# Patient Record
Sex: MALE | Race: BLACK OR AFRICAN AMERICAN | Employment: UNEMPLOYED | URBAN - METROPOLITAN AREA
[De-identification: names, ages, dates, MRNs, and addresses within clinical notes are randomized per-mention and may not be internally consistent; named-entity substitution may affect disease eponyms.]

---

## 2017-05-16 ENCOUNTER — APPOINTMENT (OUTPATIENT)
Dept: GENERAL RADIOLOGY | Age: 32
End: 2017-05-16
Payer: MEDICAID

## 2017-05-16 ENCOUNTER — HOSPITAL ENCOUNTER (EMERGENCY)
Age: 32
Discharge: HOME | End: 2017-05-16
Attending: EMERGENCY MEDICINE
Payer: MEDICAID

## 2017-05-16 VITALS
DIASTOLIC BLOOD PRESSURE: 86 MMHG | SYSTOLIC BLOOD PRESSURE: 143 MMHG | OXYGEN SATURATION: 98 % | RESPIRATION RATE: 16 BRPM | HEART RATE: 94 BPM | WEIGHT: 191 LBS | TEMPERATURE: 99 F

## 2017-05-16 DIAGNOSIS — R07.9 CHEST PAIN, UNSPECIFIED TYPE: Primary | ICD-10-CM

## 2017-05-16 DIAGNOSIS — R74.8 ELEVATED CK: ICD-10-CM

## 2017-05-16 LAB
ANION GAP SERPL CALCULATED.3IONS-SCNC: 16 MMOL/L (ref 9–17)
BILIRUBIN URINE: NEGATIVE
BUN BLDV-MCNC: 19 MG/DL (ref 6–20)
BUN/CREAT BLD: ABNORMAL (ref 9–20)
CALCIUM SERPL-MCNC: 9.3 MG/DL (ref 8.6–10.4)
CHLORIDE BLD-SCNC: 103 MMOL/L (ref 98–107)
CO2: 20 MMOL/L (ref 20–31)
COLOR: YELLOW
COMMENT UA: NORMAL
CREAT SERPL-MCNC: 0.85 MG/DL (ref 0.7–1.2)
GFR AFRICAN AMERICAN: >60 ML/MIN
GFR NON-AFRICAN AMERICAN: >60 ML/MIN
GFR SERPL CREATININE-BSD FRML MDRD: ABNORMAL ML/MIN/{1.73_M2}
GFR SERPL CREATININE-BSD FRML MDRD: ABNORMAL ML/MIN/{1.73_M2}
GLUCOSE BLD-MCNC: 120 MG/DL (ref 70–99)
GLUCOSE URINE: NEGATIVE
HCT VFR BLD CALC: 37.3 % (ref 41–53)
HEMOGLOBIN: 12.4 G/DL (ref 13.5–17.5)
KETONES, URINE: NEGATIVE
LEUKOCYTE ESTERASE, URINE: NEGATIVE
MCH RBC QN AUTO: 26.3 PG (ref 26–34)
MCHC RBC AUTO-ENTMCNC: 33.3 G/DL (ref 31–37)
MCV RBC AUTO: 78.9 FL (ref 80–100)
MYOGLOBIN: 59 NG/ML (ref 28–72)
NITRITE, URINE: NEGATIVE
PDW BLD-RTO: 15.8 % (ref 12.5–15.4)
PH UA: 5 (ref 5–8)
PLATELET # BLD: 182 K/UL (ref 140–450)
PMV BLD AUTO: 9.8 FL (ref 6–12)
POC TROPONIN I: 0 NG/ML (ref 0–0.1)
POC TROPONIN I: 0 NG/ML (ref 0–0.1)
POC TROPONIN INTERP: NORMAL
POC TROPONIN INTERP: NORMAL
POTASSIUM SERPL-SCNC: 4.2 MMOL/L (ref 3.7–5.3)
PROTEIN UA: NEGATIVE
RBC # BLD: 4.73 M/UL (ref 4.5–5.9)
SODIUM BLD-SCNC: 139 MMOL/L (ref 135–144)
SPECIFIC GRAVITY UA: 1.02 (ref 1–1.03)
TOTAL CK: 407 U/L (ref 39–308)
TURBIDITY: CLEAR
URINE HGB: NEGATIVE
UROBILINOGEN, URINE: NORMAL
WBC # BLD: 8 K/UL (ref 3.5–11)

## 2017-05-16 PROCEDURE — 83874 ASSAY OF MYOGLOBIN: CPT

## 2017-05-16 PROCEDURE — 82550 ASSAY OF CK (CPK): CPT

## 2017-05-16 PROCEDURE — G0384 LEV 5 HOSP TYPE B ED VISIT: HCPCS

## 2017-05-16 PROCEDURE — 81003 URINALYSIS AUTO W/O SCOPE: CPT

## 2017-05-16 PROCEDURE — 2580000003 HC RX 258: Performed by: EMERGENCY MEDICINE

## 2017-05-16 PROCEDURE — 71020 XR CHEST STANDARD TWO VW: CPT

## 2017-05-16 PROCEDURE — 84484 ASSAY OF TROPONIN QUANT: CPT

## 2017-05-16 PROCEDURE — 80048 BASIC METABOLIC PNL TOTAL CA: CPT

## 2017-05-16 PROCEDURE — 85027 COMPLETE CBC AUTOMATED: CPT

## 2017-05-16 PROCEDURE — 93005 ELECTROCARDIOGRAM TRACING: CPT

## 2017-05-16 RX ORDER — 0.9 % SODIUM CHLORIDE 0.9 %
1000 INTRAVENOUS SOLUTION INTRAVENOUS ONCE
Status: COMPLETED | OUTPATIENT
Start: 2017-05-16 | End: 2017-05-16

## 2017-05-16 RX ORDER — DIVALPROEX SODIUM 250 MG/1
250 TABLET, DELAYED RELEASE ORAL 3 TIMES DAILY
COMMUNITY

## 2017-05-16 RX ORDER — RISPERIDONE 0.5 MG/1
0.5 TABLET, FILM COATED ORAL 2 TIMES DAILY
COMMUNITY

## 2017-05-16 RX ADMIN — SODIUM CHLORIDE 1000 ML: 9 INJECTION, SOLUTION INTRAVENOUS at 15:12

## 2017-05-16 ASSESSMENT — PAIN SCALES - GENERAL: PAINLEVEL_OUTOF10: 10

## 2017-05-16 ASSESSMENT — PAIN DESCRIPTION - PAIN TYPE: TYPE: ACUTE PAIN

## 2017-05-16 ASSESSMENT — ENCOUNTER SYMPTOMS
VOMITING: 0
SORE THROAT: 0
ABDOMINAL PAIN: 0
RHINORRHEA: 0
NAUSEA: 0
COLOR CHANGE: 0
DIARRHEA: 1

## 2017-05-16 ASSESSMENT — PAIN DESCRIPTION - DESCRIPTORS: DESCRIPTORS: ACHING

## 2017-05-19 LAB
EKG ATRIAL RATE: 98 BPM
EKG P AXIS: 69 DEGREES
EKG P-R INTERVAL: 136 MS
EKG Q-T INTERVAL: 336 MS
EKG QRS DURATION: 74 MS
EKG QTC CALCULATION (BAZETT): 428 MS
EKG R AXIS: 76 DEGREES
EKG T AXIS: 40 DEGREES
EKG VENTRICULAR RATE: 98 BPM

## 2017-12-04 ENCOUNTER — HOSPITAL ENCOUNTER (EMERGENCY)
Age: 32
Discharge: HOME | End: 2017-12-04
Attending: EMERGENCY MEDICINE
Payer: MEDICAID

## 2017-12-04 ENCOUNTER — APPOINTMENT (OUTPATIENT)
Dept: GENERAL RADIOLOGY | Age: 32
End: 2017-12-04
Payer: MEDICAID

## 2017-12-04 VITALS
BODY MASS INDEX: 26.79 KG/M2 | RESPIRATION RATE: 16 BRPM | HEART RATE: 66 BPM | DIASTOLIC BLOOD PRESSURE: 81 MMHG | SYSTOLIC BLOOD PRESSURE: 126 MMHG | HEIGHT: 76 IN | OXYGEN SATURATION: 94 % | WEIGHT: 220 LBS | TEMPERATURE: 97.5 F

## 2017-12-04 DIAGNOSIS — R09.81 NASAL SINUS CONGESTION: ICD-10-CM

## 2017-12-04 DIAGNOSIS — R05.9 COUGH: Primary | ICD-10-CM

## 2017-12-04 LAB
EKG ATRIAL RATE: 68 BPM
EKG P AXIS: 59 DEGREES
EKG P-R INTERVAL: 152 MS
EKG Q-T INTERVAL: 376 MS
EKG QRS DURATION: 82 MS
EKG QTC CALCULATION (BAZETT): 399 MS
EKG R AXIS: 75 DEGREES
EKG T AXIS: 36 DEGREES
EKG VENTRICULAR RATE: 68 BPM

## 2017-12-04 PROCEDURE — 71020 XR CHEST STANDARD TWO VW: CPT

## 2017-12-04 PROCEDURE — 6370000000 HC RX 637 (ALT 250 FOR IP): Performed by: STUDENT IN AN ORGANIZED HEALTH CARE EDUCATION/TRAINING PROGRAM

## 2017-12-04 PROCEDURE — 93005 ELECTROCARDIOGRAM TRACING: CPT

## 2017-12-04 PROCEDURE — 99284 EMERGENCY DEPT VISIT MOD MDM: CPT

## 2017-12-04 RX ORDER — BENZONATATE 100 MG/1
100 CAPSULE ORAL ONCE
Status: COMPLETED | OUTPATIENT
Start: 2017-12-04 | End: 2017-12-04

## 2017-12-04 RX ORDER — BENZONATATE 100 MG/1
100 CAPSULE ORAL 3 TIMES DAILY PRN
Qty: 50 CAPSULE | Refills: 0 | Status: SHIPPED | OUTPATIENT
Start: 2017-12-04 | End: 2017-12-11

## 2017-12-04 RX ADMIN — BENZONATATE 100 MG: 100 CAPSULE ORAL at 11:09

## 2017-12-04 ASSESSMENT — ENCOUNTER SYMPTOMS
STRIDOR: 0
EYE PAIN: 0
CHOKING: 0
CHEST TIGHTNESS: 1
PHOTOPHOBIA: 0
EYE REDNESS: 1
RHINORRHEA: 0
SINUS PRESSURE: 1
ABDOMINAL DISTENTION: 0
APNEA: 0
NAUSEA: 0
EYE DISCHARGE: 0
CONSTIPATION: 0
TROUBLE SWALLOWING: 0
SINUS PAIN: 1
VOMITING: 0
SORE THROAT: 0
ABDOMINAL PAIN: 1
WHEEZING: 0
SHORTNESS OF BREATH: 0
EYE ITCHING: 1
VOICE CHANGE: 0
DIARRHEA: 0
COUGH: 1
ALLERGIC/IMMUNOLOGIC NEGATIVE: 1

## 2017-12-04 ASSESSMENT — PAIN SCALES - GENERAL: PAINLEVEL_OUTOF10: 8

## 2017-12-04 ASSESSMENT — PAIN DESCRIPTION - DESCRIPTORS: DESCRIPTORS: ACHING

## 2017-12-04 ASSESSMENT — PAIN DESCRIPTION - PAIN TYPE: TYPE: CHRONIC PAIN;ACUTE PAIN

## 2017-12-04 ASSESSMENT — PAIN DESCRIPTION - LOCATION: LOCATION: CHEST

## 2017-12-04 NOTE — ED PROVIDER NOTES
[facility] ED  eMERGENCY dEPARTMENT eNCOUnter   Attending Attestation     Pt Name: Sylvester Wall  MRN: [id number]  [place] 1985  Date of evaluation: 12/4/17       Sylvester Wall is a 34 y.o. male who presents with URI (pt c/o cough, sinus pressure and pain, congestion and green nasal drainage.) and Chest Pain (pt c/o chest pain x years)      History: Patient presents with chronic greater than 6 months history of sinus pressure, congestion, nasal drainage, right sided chest pain which she's had for years. Patient has had a cardiac workup for this in the past.  Patient denies any new symptoms. Patient says the pain is intermittent. Patient said it is sometimes reproducible with tapping on the right-sided chest.  Patient has no shortness of breath. No fever, chills, nausea, vomiting, diarrhea. Patient been treated for allergies by his primary care physician and he said this is not helping. Exam:   Physical Exam   Constitutional: He is well-developed, well-nourished, and in no distress. No distress. HENT:   Head: Normocephalic and atraumatic. Eyes: EOM are normal. Pupils are equal, round, and reactive to light. Right eye exhibits no discharge. Left eye exhibits no discharge. Neck: Normal range of motion. Neck supple. No JVD present. No tracheal deviation present. Cardiovascular: Normal rate, regular rhythm, normal heart sounds and intact distal pulses. Exam reveals no gallop and no friction rub. No murmur heard. Pulmonary/Chest: Effort normal and breath sounds normal. No stridor. No respiratory distress. He has no wheezes. He has no rales. Abdominal: Soft. Bowel sounds are normal. He exhibits no distension and no mass. There is no tenderness. There is no rebound and no guarding. Musculoskeletal: Normal range of motion. He exhibits no edema or tenderness. Neurological: He is alert. No cranial nerve deficit. Coordination normal. GCS score is 15.    Skin: Skin is warm and dry. No rash noted. No erythema. Psychiatric: Mood and affect normal.     EKG Interpretation    Interpreted by emergency department physician    Rhythm: sinus with sinus arrhythmia  Rate: normal  Axis: normal  Ectopy: none  Conduction: normal  ST Segments: no acute change  T Waves: no acute change  Q Waves: none    Clinical Impression: non-specific EKG, sinus rhythm with sinus arrhythmia. Coral El M.D. Will get chest xray, will treat cough, plan for discharge. Pt to follow up with pcp. I performed a history and physical examination of the patient and discussed management with the resident. I reviewed the residents note and agree with the documented findings and plan of care. Any areas of disagreement are noted on the chart. I was personally present for the key portions of any procedures. I have documented in the chart those procedures where I was not present during the key portions. I have personally reviewed all images and agree with the resident's interpretation. I have reviewed the emergency nurses triage note. I agree with the chief complaint, past medical history, past surgical history, allergies, medications, social and family history as documented unless otherwise noted below. Documentation of the HPI, Physical Exam and Medical Decision Making performed by medical students or scribes is based on my personal performance of the HPI, PE and MDM. For Phys Assistant/ Nurse Practitioner cases/documentation I have had a face to face evaluation of this patient and have completed at least one if not all key elements of the E/M (history, physical exam, and MDM). Additional findings are as noted. For APC cases I have personally evaluated and examined the patient in conjunction with the APC and agree with the treatment plan and disposition of the patient as recorded by the APC.     Coral El MD  Attending Emergency  Physician        Brendan Ignacio MD  12/04/17 [id number]

## 2017-12-04 NOTE — ED PROVIDER NOTES
[address]  ED  eMERGENCY dEPARTMENT eNCOUnter  Resident    Pt Name: Megan Kenney  MRN: [id number]  [place] 1985  Date of evaluation: 12/4/2017  PCP:  No primary care provider on file. CHIEF COMPLAINT       Chief Complaint   Patient presents with    URI     pt c/o cough, sinus pressure and pain, congestion and green nasal drainage.  Chest Pain     pt c/o chest pain x years       HISTORY OF PRESENT ILLNESS    Megan Kenney is a 34 y.o. male who presents with multiple complaints: sinus congestion with headache, cough, and central/right-sided chest pain. Patient states his condition has been chronic over years but worsening within the past few days. He denies any known allergies. He smokes about 1 pack per 3 days. He states zyrtec alleviates some of his upper respiratory symptoms but the nasal spray does not. He believes the headache is relates to his sinus congestion. He states the chest pain is worse with coughing, and he denies left-sided chest pain, palpitations or swelling in the legs. He admits his upper respiratory symptoms improved when he took a family member's mucinex. He also has a positive pscyh history and states he takes trazodone and Desyrel as prescribed. REVIEW OF SYSTEMS       Review of Systems   Constitutional: Negative for activity change, chills, diaphoresis, fatigue and fever. HENT: Positive for congestion, sinus pain and sinus pressure. Negative for ear discharge, ear pain, postnasal drip, rhinorrhea, sneezing, sore throat, tinnitus, trouble swallowing and voice change. Eyes: Positive for redness and itching. Negative for photophobia, pain, discharge and visual disturbance. Respiratory: Positive for cough and chest tightness. Negative for apnea, choking, shortness of breath, wheezing and stridor. Cardiovascular: Positive for chest pain. Negative for palpitations and leg swelling.         Left sided and central chest pain   Gastrointestinal: Positive for abdominal pain. Negative for abdominal distention, constipation, diarrhea, nausea and vomiting. Vague abdominal pain   Endocrine: Negative. Genitourinary: Negative. Musculoskeletal: Positive for myalgias. Negative for arthralgias. Chest pain, abdominal pain, non-specific   Skin: Negative for rash. Allergic/Immunologic: Negative. Neurological: Positive for headaches. Negative for syncope, speech difficulty, weakness and numbness. Hematological: Negative. Psychiatric/Behavioral: Negative for agitation and confusion. Diagnosis of schizophrenia and OCD       PAST MEDICAL HISTORY    has a past medical history of OCD (obsessive compulsive disorder) and Schizophrenia, borderline. SURGICAL HISTORY      has no past surgical history on file. CURRENT MEDICATIONS       Previous Medications    CETIRIZINE (ZYRTEC) 1 MG/ML SYRUP    Take 10 mg by mouth daily    DIVALPROEX (DEPAKOTE) 250 MG DR TABLET    Take 250 mg by mouth 3 times daily    OXYMETAZOLINE HCL (NASAL SPRAY NA)    by Nasal route    RISPERIDONE (RISPERDAL) 0.5 MG TABLET    Take 0.5 mg by mouth 2 times daily       ALLERGIES     has No Known Allergies. FAMILY HISTORY     has no family status information on file. family history is not on file. SOCIAL HISTORY      reports that he has been smoking. He does not have any smokeless tobacco history on file. He reports that he does not drink alcohol or use drugs. PHYSICAL EXAM     INITIAL VITALS:  height is 6' 4\" (1.93 m) and weight is 220 lb (99.8 kg). His oral temperature is 97.5 °F (36.4 °C). His blood pressure is 126/81 and his pulse is 66. His respiration is 16 and oxygen saturation is 94%. Physical Exam   Constitutional: He is oriented to person, place, and time. He appears well-developed and well-nourished. No distress. HENT:   Head: Normocephalic and atraumatic.    Right Ear: External ear normal.   Left Ear: External ear normal.   Nose: Nose normal.   Mouth/Throat: Oropharynx is clear and moist.   Eyes: Conjunctivae and EOM are normal. Pupils are equal, round, and reactive to light. Right eye exhibits no discharge. Left eye exhibits no discharge. Neck: Normal range of motion. Neck supple. Cardiovascular: Normal rate, regular rhythm and normal heart sounds. Pulmonary/Chest: Effort normal and breath sounds normal. No respiratory distress. He has no wheezes. He has no rales. He exhibits tenderness. Central and right-sided tenderness of chest   Abdominal: Soft. Bowel sounds are normal. He exhibits no distension. There is tenderness. Musculoskeletal: Normal range of motion. Neurological: He is alert and oriented to person, place, and time. Skin: Skin is warm and dry. No rash noted. He is not diaphoretic. No erythema. No pallor. Psychiatric: He has a normal mood and affect. His behavior is normal.       DIFFERENTIAL DIAGNOSIS/MDM:   URI vs Bronchitis vs Pneumonia     DIAGNOSTIC RESULTS     EKG: All EKG's are interpreted by the Emergency Department Physician who either signs or Co-signs this chart in the absence of a cardiologist.    Sinus arrhythmia     RADIOLOGY:   I directly visualized the following  images and reviewed the radiologist interpretations:  XR CHEST STANDARD (2 VW)   Final Result   Unremarkable chest.             ED BEDSIDE ULTRASOUND:   n/a    LABS:  Labs Reviewed - No data to display    n/a      EMERGENCY DEPARTMENT COURSE:   Vitals:    Vitals:    12/04/17 1020   BP: 126/81   Pulse: 66   Resp: 16   Temp: 97.5 °F (36.4 °C)   TempSrc: Oral   SpO2: 94%   Weight: 220 lb (99.8 kg)   Height: 6' 4\" (1.93 m)       CRITICAL CARE:  n/a    CONSULTS:  None      PROCEDURES:  Procedures - n/a      FINAL IMPRESSION      1. Cough    2. Nasal sinus congestion        Patient presents with symptoms consistent of chronic sinus congestions and cough with associated chest pain; he is afebrile on presentation.  EKG ruled out cardiac origin of

## 2020-07-24 ENCOUNTER — HOSPITAL ENCOUNTER (EMERGENCY)
Age: 35
Discharge: HOME | End: 2020-07-24
Attending: EMERGENCY MEDICINE
Payer: MEDICAID

## 2020-07-24 VITALS
TEMPERATURE: 98.1 F | SYSTOLIC BLOOD PRESSURE: 125 MMHG | DIASTOLIC BLOOD PRESSURE: 80 MMHG | HEART RATE: 72 BPM | BODY MASS INDEX: 24.36 KG/M2 | WEIGHT: 200 LBS | OXYGEN SATURATION: 97 % | HEIGHT: 76 IN | RESPIRATION RATE: 18 BRPM

## 2020-07-24 PROCEDURE — 99282 EMERGENCY DEPT VISIT SF MDM: CPT

## 2020-07-24 RX ORDER — NEOMYCIN SULFATE, POLYMYXIN B SULFATE, HYDROCORTISONE 3.5; 10000; 1 MG/ML; [USP'U]/ML; MG/ML
1 SOLUTION/ DROPS AURICULAR (OTIC) EVERY 8 HOURS SCHEDULED
Qty: 1 BOTTLE | Refills: 0 | Status: SHIPPED | OUTPATIENT
Start: 2020-07-24 | End: 2020-08-03

## 2020-07-24 ASSESSMENT — ENCOUNTER SYMPTOMS
VOMITING: 0
NAUSEA: 0

## 2020-07-24 NOTE — ED PROVIDER NOTES
[address] [facility]     Emergency Department     Faculty Attestation    I performed a history and physical examination of the patient and discussed management with the resident. I reviewed the residents note and agree with the documented findings and plan of care. Any areas of disagreement are noted on the chart. I was personally present for the key portions of any procedures. I have documented in the chart those procedures where I was not present during the key portions. I have reviewed the emergency nurses triage note. I agree with the chief complaint, past medical history, past surgical history, allergies, medications, social and family history as documented unless otherwise noted below. For Physician Assistant/ Nurse Practitioner cases/documentation I have personally evaluated this patient and have completed at least one if not all key elements of the E/M (history, physical exam, and MDM). Additional findings are as noted. I have personally seen and evaluated the patient. I find the patient's history and physical exam are consistent with the NP/PA documentation. I agree with the care provided, treatment rendered, disposition and follow-up plan. fb r ear canal no bleeding       Critical Care     Derek Gonzales M.D.   Attending Emergency  Physician              Celso Mercedes MD  07/24/20 [id number]

## 2020-07-24 NOTE — ED PROVIDER NOTES
[facility] ED  Emergency Department Encounter  EmergencyMedicine Resident     Pt Name:Avinash Yi  MRN: [id number]  [place] 1985  Date of evaluation: 7/24/20  PCP:  No primary care provider on file. CHIEF COMPLAINT       Chief Complaint   Patient presents with    Foreign Body in Ear     Pt c/o something in rt ear       HISTORY OF PRESENT ILLNESS  (Location/Symptom, Timing/Onset, Context/Setting, Quality, Duration, Modifying Factors, Severity.)      Qasim Jackson is a 38 y.o. male who presents with foreign object in right ear. Patient placed a \"seed\" in his ear earlier today. He was unable to get it out. Patient tried removing it with a Q-tip but was unsuccessful. Patient states his ear is uncomfortable but not in acute pain. PAST MEDICAL / SURGICAL / SOCIAL / FAMILY HISTORY      has a past medical history of OCD (obsessive compulsive disorder) and Schizophrenia, borderline ([facility] [address] has no past surgical history on file.     Social History     Socioeconomic History    Marital status: Single     Spouse name: Not on file    Number of children: Not on file    Years of education: Not on file    Highest education level: Not on file   Occupational History    Not on file   Social Needs    Financial resource strain: Not on file    Food insecurity     Worry: Not on file     Inability: Not on file    Transportation needs     Medical: Not on file     Non-medical: Not on file   Tobacco Use    Smoking status: Current Every Day Smoker   Substance and Sexual Activity    Alcohol use: No    Drug use: No    Sexual activity: Not on file   Lifestyle    Physical activity     Days per week: Not on file     Minutes per session: Not on file    Stress: Not on file   Relationships    Social connections     Talks on phone: Not on file     Gets together: Not on file     Attends Mosque service: Not on file     Active member of club or organization: Not on file     Attends meetings of clubs or organizations: Not on file     Relationship status: Not on file    Intimate partner violence     Fear of current or ex partner: Not on file     Emotionally abused: Not on file     Physically abused: Not on file     Forced sexual activity: Not on file   Other Topics Concern    Not on file   Social History Narrative    Not on file       History reviewed. No pertinent family history. Allergies:  Patient has no known allergies. Home Medications:  Prior to Admission medications    Medication Sig Start Date End Date Taking? Authorizing Provider   neomycin-polymyxin-hydrocortisone 1 % SOLN otic solution Place 1 drop into both ears every 8 hours for 10 days 7/24/20 8/3/20 Yes Lamont Coughlin,    cetirizine (ZYRTEC) 1 MG/ML syrup Take 10 mg by mouth daily    Historical Provider, MD   Oxymetazoline HCl (NASAL SPRAY NA) by Nasal route    Historical Provider, MD   divalproex (DEPAKOTE) 250 MG DR tablet Take 250 mg by mouth 3 times daily    Historical Provider, MD   risperiDONE (RISPERDAL) 0.5 MG tablet Take 0.5 mg by mouth 2 times daily    Historical Provider, MD       REVIEW OF SYSTEMS    (2-9 systems for level 4, 10 or more for level 5)      Review of Systems   HENT: Positive for ear pain. Foreign object in ear   Gastrointestinal: Negative for nausea and vomiting. Skin: Negative for wound. Neurological: Negative for headaches. Psychiatric/Behavioral: Negative for self-injury and suicidal ideas. PHYSICAL EXAM   (up to 7 for level 4, 8 or more for level 5)      INITIAL VITALS:   /80   Pulse 72   Temp 98.1 °F (36.7 °C) (Oral)   Resp 18   Ht 6' 4\" (1.93 m)   Wt 200 lb (90.7 kg)   SpO2 97%   BMI 24.34 kg/m²     Physical Exam  Vitals signs reviewed. Constitutional:       General: He is not in acute distress. Appearance: Normal appearance. He is well-developed. HENT:      Head: Normocephalic and atraumatic. Right Ear: A foreign body is present. Left Ear: Tympanic membrane normal.      Ears:      Comments: Round foreign body deep and right EAC. Nearly completely occludes EAC. Eyes:      Extraocular Movements: Extraocular movements intact. Pupils: Pupils are equal, round, and reactive to light. Neck:      Musculoskeletal: Normal range of motion and neck supple. Cardiovascular:      Rate and Rhythm: Normal rate and regular rhythm. Pulmonary:      Effort: Pulmonary effort is normal. No respiratory distress. Musculoskeletal: Normal range of motion. Skin:     General: Skin is warm and dry. Neurological:      General: No focal deficit present. Mental Status: He is alert and oriented to person, place, and time. DIFFERENTIAL  DIAGNOSIS     PLAN (LABS / IMAGING / EKG):  No orders of the defined types were placed in this encounter. MEDICATIONS ORDERED:  Orders Placed This Encounter   Medications    neomycin-polymyxin-hydrocortisone 1 % SOLN otic solution     Sig: Place 1 drop into both ears every 8 hours for 10 days     Dispense:  1 Bottle     Refill:  0       DIAGNOSTIC RESULTS / EMERGENCY DEPARTMENT COURSE / MDM   LAB RESULTS:  No results found for this visit on 07/24/20. IMPRESSION: Foreign object in ear    RADIOLOGY:    EKG    All EKG's are interpreted by the Emergency Department Physician who either signs or Co-signs this chart in the absence of a cardiologist.    [facility]:    Patient arrives for evaluation of foreign object in ear. The object was attempted to be removed via multiple methods including ear curette, alligator forceps, extensive pressure irrigation and suction. These attempts were unsuccessful partly from patient poorly tolerating the procedure and because the foreign object nearly completely occludes external auditory canal and unable to access behind the foreign object. Patient was provided with antibiotic drops to prevent any infection and given ENT follow-up.

## 2020-07-24 NOTE — ED NOTES
Bed: 10  Expected date:   Expected time:   Means of arrival:   Comments:     Lakesha Zaragoza RN  07/24/20 [id number]

## 2022-12-21 ENCOUNTER — HOSPITAL ENCOUNTER (OUTPATIENT)
Age: 37
Setting detail: SPECIMEN
Discharge: HOME | End: 2022-12-21

## 2022-12-22 LAB
ABSOLUTE EOS #: 0.85 K/UL (ref 0–0.44)
ABSOLUTE IMMATURE GRANULOCYTE: <0.03 K/UL (ref 0–0.3)
ABSOLUTE LYMPH #: 2.32 K/UL (ref 1.1–3.7)
ABSOLUTE MONO #: 0.75 K/UL (ref 0.1–1.2)
ALBUMIN SERPL-MCNC: 4.3 G/DL (ref 3.5–5.2)
ALBUMIN/GLOBULIN RATIO: 1.6 (ref 1–2.5)
ALP BLD-CCNC: 47 U/L (ref 40–129)
ALT SERPL-CCNC: 24 U/L (ref 5–41)
ANION GAP SERPL CALCULATED.3IONS-SCNC: 29 MMOL/L (ref 9–17)
AST SERPL-CCNC: 25 U/L
BASOPHILS # BLD: 1 % (ref 0–2)
BASOPHILS ABSOLUTE: 0.07 K/UL (ref 0–0.2)
BILIRUB SERPL-MCNC: 0.3 MG/DL (ref 0.3–1.2)
BILIRUBIN URINE: NEGATIVE
BUN BLDV-MCNC: 17 MG/DL (ref 6–20)
CALCIUM SERPL-MCNC: 9 MG/DL (ref 8.6–10.4)
CHLORIDE BLD-SCNC: 96 MMOL/L (ref 98–107)
CHOLESTEROL/HDL RATIO: 5.8
CHOLESTEROL: 202 MG/DL
CO2: 24 MMOL/L (ref 20–31)
COLOR: YELLOW
COMMENT UA: NORMAL
CREAT SERPL-MCNC: 1.04 MG/DL (ref 0.7–1.2)
EOSINOPHILS RELATIVE PERCENT: 14 % (ref 1–4)
GFR SERPL CREATININE-BSD FRML MDRD: >60 ML/MIN/1.73M2
GLUCOSE BLD-MCNC: 81 MG/DL (ref 70–99)
GLUCOSE URINE: NEGATIVE
HCT VFR BLD CALC: 42.2 % (ref 40.7–50.3)
HDLC SERPL-MCNC: 35 MG/DL
HEMOGLOBIN: 13.7 G/DL (ref 13–17)
IMMATURE GRANULOCYTES: 0 %
KETONES, URINE: NEGATIVE
LDL CHOLESTEROL: 149 MG/DL (ref 0–130)
LEUKOCYTE ESTERASE, URINE: NEGATIVE
LYMPHOCYTES # BLD: 37 % (ref 24–43)
MCH RBC QN AUTO: 25.4 PG (ref 25.2–33.5)
MCHC RBC AUTO-ENTMCNC: 32.5 G/DL (ref 28.4–34.8)
MCV RBC AUTO: 78.1 FL (ref 82.6–102.9)
MONOCYTES # BLD: 12 % (ref 3–12)
NITRITE, URINE: NEGATIVE
NRBC AUTOMATED: 0 PER 100 WBC
PDW BLD-RTO: 14.9 % (ref 11.8–14.4)
PH UA: 6.5 (ref 5–8)
PLATELET # BLD: 210 K/UL (ref 138–453)
PMV BLD AUTO: 12 FL (ref 8.1–13.5)
POTASSIUM SERPL-SCNC: 4.6 MMOL/L (ref 3.7–5.3)
PROTEIN UA: NEGATIVE
RBC # BLD: 5.4 M/UL (ref 4.21–5.77)
RBC # BLD: ABNORMAL 10*6/UL
SEG NEUTROPHILS: 36 % (ref 36–65)
SEGMENTED NEUTROPHILS ABSOLUTE COUNT: 2.25 K/UL (ref 1.5–8.1)
SODIUM BLD-SCNC: 149 MMOL/L (ref 135–144)
SPECIFIC GRAVITY UA: 1.02 (ref 1–1.03)
TOTAL PROTEIN: 7 G/DL (ref 6.4–8.3)
TRIGL SERPL-MCNC: 88 MG/DL
TSH SERPL DL<=0.05 MIU/L-ACNC: 1.6 UIU/ML (ref 0.3–5)
TURBIDITY: CLEAR
URINE HGB: NEGATIVE
UROBILINOGEN, URINE: NORMAL
VITAMIN D 25-HYDROXY: 20.7 NG/ML
WBC # BLD: 6.3 K/UL (ref 3.5–11.3)